# Patient Record
Sex: FEMALE | URBAN - METROPOLITAN AREA
[De-identification: names, ages, dates, MRNs, and addresses within clinical notes are randomized per-mention and may not be internally consistent; named-entity substitution may affect disease eponyms.]

---

## 2018-06-28 ENCOUNTER — APPOINTMENT (RX ONLY)
Dept: URBAN - METROPOLITAN AREA CLINIC 23 | Facility: CLINIC | Age: 73
Setting detail: DERMATOLOGY
End: 2018-06-28

## 2018-06-28 DIAGNOSIS — Z41.9 ENCOUNTER FOR PROCEDURE FOR PURPOSES OTHER THAN REMEDYING HEALTH STATE, UNSPECIFIED: ICD-10-CM

## 2018-06-28 PROCEDURE — ? LASER HAIR REMOVAL

## 2018-06-28 PROCEDURE — ? IN-HOUSE DISPENSING PHARMACY

## 2018-06-28 ASSESSMENT — LOCATION ZONE DERM
LOCATION ZONE: FACE
LOCATION ZONE: LIP

## 2018-06-28 ASSESSMENT — LOCATION SIMPLE DESCRIPTION DERM
LOCATION SIMPLE: LEFT CHEEK
LOCATION SIMPLE: LEFT LIP

## 2018-06-28 ASSESSMENT — LOCATION DETAILED DESCRIPTION DERM
LOCATION DETAILED: LEFT LOWER CUTANEOUS LIP
LOCATION DETAILED: LEFT SUPERIOR CENTRAL BUCCAL CHEEK

## 2018-06-28 NOTE — PROCEDURE: LASER HAIR REMOVAL
Render Post-Care In The Note: No
Fluence (Will Not Render If 0): [address] [facility]
Pre-Procedure: Prior to proceeding the treatment areas were cleaned and all present put on their eye protection.
Number Of Prepaid Treatments (Will Not Render If 0): 0
Treatment Number: 3
Consent: Written consent obtained, risks reviewed including but not limited to crusting, scabbing, blistering, scarring, darker or lighter pigmentary change, paradoxical hair regrowth, incomplete removal of hair and infection.
Fluence (Will Not Render If 0): 20
Anesthesia Type: 1% lidocaine with epinephrine
Laser Type: Prowave 770
Cooling: chill tip
Fluence (Will Not Render If 0): [address]
Total Area (Optional- Include Units): chin
Post-Care Instructions: I reviewed with the patient in detail post-care instructions. Patient should avoid sun for a minimum of 4 weeks before and after treatment.
Tolerated Procedure (Optional): Tolerated Well
Shaving (Optional): The patient was shaved in the office prior to the procedure
Post-Procedure Care: Immediate endpoint: perifollicular erythema and edema. Vaseline and ice applied. Post care reviewed with patient.
Pulse Duration: 20 ms
Topical Anesthesia Type: ice
Detail Level: Zone

## 2018-06-28 NOTE — PROCEDURE: IN-HOUSE DISPENSING PHARMACY
Product 23 Refills: 0
Product 2 Application Directions: apply to face daily am and pm as indicated
Product 5 Amount/Unit (Numbers Only): 6005 [facility]
Product 2 Price/Unit (In Dollars): 0.00
Product 70 Unit Type: mg
Render Product Pricing In Note: No
Product 1 Amount/Unit (Numbers Only): 1
Product 4 Unit Type: tube(s)
Product 6 Unit Type: tablets
Name Of Product 5: Prednisone 20 mg
Name Of Product 3: Glycolic cleanser 10 %
Name Of Product 7: Latisse
Product 6 Amount/Unit (Numbers Only): 6
Product 4 Application Directions: Apply pea sized amount to entire face at bedtime
Name Of Product 2: brightening pads 6%
Product 3 Application Directions: Wash face, arms and back daily as instructed
Detail Level: Zone
Name Of Product 6: Valtrex
Product 5 Application Directions: Take one tablet in office and Take one tablet tomorrow am for swelling with food and full glas of water
Product 1 Application Directions: Apply thin layer to entire face at bedtime only
Product 2 Unit Type: jar(s)
Name Of Product 4: Tretinoin 0.05%
Name Of Product 1: Hydroquinone 4% / Tretinoin 0.05% / Fluocinolone 0.01%
Product 6 Application Directions: Take one pill in AM and PM x3 days